# Patient Record
Sex: MALE | Race: BLACK OR AFRICAN AMERICAN | Employment: FULL TIME | URBAN - METROPOLITAN AREA
[De-identification: names, ages, dates, MRNs, and addresses within clinical notes are randomized per-mention and may not be internally consistent; named-entity substitution may affect disease eponyms.]

---

## 2021-10-16 ENCOUNTER — HOSPITAL ENCOUNTER (EMERGENCY)
Age: 41
Discharge: HOME | End: 2021-10-16
Attending: EMERGENCY MEDICINE
Payer: MEDICAID

## 2021-10-16 VITALS
WEIGHT: 155 LBS | BODY MASS INDEX: 25.83 KG/M2 | OXYGEN SATURATION: 98 % | HEART RATE: 90 BPM | HEIGHT: 65 IN | RESPIRATION RATE: 15 BRPM | TEMPERATURE: 98.8 F | DIASTOLIC BLOOD PRESSURE: 90 MMHG | SYSTOLIC BLOOD PRESSURE: 138 MMHG

## 2021-10-16 DIAGNOSIS — R11.2 NON-INTRACTABLE VOMITING WITH NAUSEA, UNSPECIFIED VOMITING TYPE: Primary | ICD-10-CM

## 2021-10-16 PROCEDURE — 99283 EMERGENCY DEPT VISIT LOW MDM: CPT

## 2021-10-16 PROCEDURE — 6370000000 HC RX 637 (ALT 250 FOR IP): Performed by: STUDENT IN AN ORGANIZED HEALTH CARE EDUCATION/TRAINING PROGRAM

## 2021-10-16 RX ORDER — FAMOTIDINE 20 MG/1
20 TABLET, FILM COATED ORAL 2 TIMES DAILY
Qty: 10 TABLET | Refills: 0 | Status: SHIPPED | OUTPATIENT
Start: 2021-10-16

## 2021-10-16 RX ORDER — ONDANSETRON 4 MG/1
4 TABLET, FILM COATED ORAL EVERY 8 HOURS PRN
Qty: 5 TABLET | Refills: 0 | Status: SHIPPED | OUTPATIENT
Start: 2021-10-16

## 2021-10-16 RX ORDER — FAMOTIDINE 20 MG/1
20 TABLET, FILM COATED ORAL ONCE
Status: COMPLETED | OUTPATIENT
Start: 2021-10-16 | End: 2021-10-16

## 2021-10-16 RX ORDER — ONDANSETRON 4 MG/1
4 TABLET, ORALLY DISINTEGRATING ORAL ONCE
Status: COMPLETED | OUTPATIENT
Start: 2021-10-16 | End: 2021-10-16

## 2021-10-16 RX ADMIN — ONDANSETRON 4 MG: 4 TABLET, ORALLY DISINTEGRATING ORAL at 12:59

## 2021-10-16 RX ADMIN — FAMOTIDINE 20 MG: 20 TABLET, FILM COATED ORAL at 12:59

## 2021-10-16 ASSESSMENT — ENCOUNTER SYMPTOMS
BACK PAIN: 0
BLOOD IN STOOL: 0
ABDOMINAL PAIN: 1
RECTAL PAIN: 0
ANAL BLEEDING: 0
DIARRHEA: 0
SINUS PAIN: 0
CONSTIPATION: 0
NAUSEA: 1
EYE PAIN: 0
COUGH: 0
SORE THROAT: 0
VOMITING: 1
SHORTNESS OF BREATH: 0

## 2021-10-16 NOTE — Clinical Note
Patricia Andi was seen and treated in our emergency department on 10/16/2021. He may return to work on 10/17/2021. If you have any questions or concerns, please don't hesitate to call.       Chelsey Hammond MD

## 2021-10-16 NOTE — ED PROVIDER NOTES
[facility] ED     Emergency Department     Faculty Attestation    I performed a history and physical examination of the patient and discussed management with the resident. I reviewed the residents note and agree with the documented findings and plan of care. Any areas of disagreement are noted on the chart. I was personally present for the key portions of any procedures. I have documented in the chart those procedures where I was not present during the key portions. I have reviewed the emergency nurses triage note. I agree with the chief complaint, past medical history, past surgical history, allergies, medications, social and family history as documented unless otherwise noted below. For Physician Assistant/ Nurse Practitioner cases/documentation I have personally evaluated this patient and have completed at least one if not all key elements of the E/M (history, physical exam, and MDM). Additional findings are as noted. This patient was evaluated in the Emergency Department for symptoms described in the history of present illness. He/she was evaluated in the context of the global COVID-19 pandemic, which necessitated consideration that the patient might be at risk for infection with the SARS-CoV-2 virus that causes COVID-19. Institutional protocols and algorithms that pertain to the evaluation of patients at risk for COVID-19 are in a state of rapid change based on information released by regulatory bodies including the CDC and federal and state organizations. These policies and algorithms were followed during the patient's care in the ED. Patient here with abdominal pain vomiting diarrhea after eating [employer] last night. Several friends ate similar food at the same time no other symptoms for them. However patient states he has not had a fast food hamburger or [employer] fries in many years. Still has an appetite. No blood in the emesis or stools.   On exam nontoxic well-appearing abdomen soft nontender no rebound or guarding. Likely foods sensitivity due to having not had fast food.   Will give Zofran, ensure can tolerate p.o., anticipate discharge      Critical Care     none    Rufus Maldonado MD, Travis Gomez  Attending Emergency  Physician             Rufus Maldonado MD  10/16/21 1300

## 2021-10-16 NOTE — ED NOTES
This patient was assessed by the doctor only. Nurse processed and completed the orders from this doctor ie labs, meds, and/or EKG.       Filipe Romero RN  10/16/21 0861

## 2021-10-16 NOTE — ED PROVIDER NOTES
[facility] ED  Emergency Department Encounter  EmergencyMedicineResident     This patient was seen during the COVID-19 crisis. There were limited resources and those resources we did have had to be conserved for the sickest of patients. Pt Name: Edgar Fischer  MRN: [id number]  [place] 1980  Date of evaluation: 10/16/21  PCP: No primary care provider on file. CHIEF COMPLAINT       Chief Complaint   Patient presents with    Emesis     thinks he may have food poision after eating [employer] big mac last night     Diarrhea       HISTORY OF PRESENT ILLNESS  (Location/Symptom, Timing/Onset, Context/Setting, Quality, Duration, Modifying Factors, Severity.)      Edgar Fischer is a 46 y.o. male who presents for evaluation of single episode of nausea and emesis this morning in multiple bouts of formed bowel movements throughout the day today after eating [employer] for the first time in over 3 years. Patient states that he had quarter pounder patties with a big Mac last night. Patient states that he woke up this morning feeling nauseous and vomited. He then had multiple bowel movements today formed stool. He states he had to miss work because he was feeling ill. He denies any blood in his emesis or in his BM. He states he is feeling well prior. No infectious etiology. PAST MEDICAL / SURGICAL /SOCIAL / FAMILY HISTORY      has no past medical history on file. has no past surgical history on file.       Social History     Socioeconomic History    Marital status: Single     Spouse name: Not on file    Number of children: Not on file    Years of education: Not on file    Highest education level: Not on file   Occupational History    Not on file   Tobacco Use    Smoking status: Not on file   Substance and Sexual Activity    Alcohol use: Not on file    Drug use: Not on file    Sexual activity: Not on file   Other Topics Concern    Not on file   Social History Narrative    Not on file     Social Determinants of Health     Financial Resource Strain:     Difficulty of Paying Living Expenses:    Food Insecurity:     Worried About Running Out of Food in the Last Year:     [address] in the Last Year:    Transportation Needs:     Lack of Transportation (Medical):  Lack of Transportation (Non-Medical):    Physical Activity:     Days of Exercise per Week:     Minutes of Exercise per Session:    Stress:     Feeling of Stress :    Social Connections:     Frequency of Communication with Friends and Family:     Frequency of Social Gatherings with Friends and Family:     Attends Worship Services:     Active Member of Clubs or Organizations:     Attends Club or Organization Meetings:     Marital Status:    Intimate Partner Violence:     Fear of Current or Ex-Partner:     Emotionally Abused:     Physically Abused:     Sexually Abused:        No family history on file. Allergies:  Patient has no known allergies. Home Medications:  Prior to Admission medications    Medication Sig Start Date End Date Taking? Authorizing Provider   ondansetron (ZOFRAN) 4 MG tablet Take 1 tablet by mouth every 8 hours as needed for Nausea 10/16/21  Yes Darling Rodriguez MD   famotidine (PEPCID) 20 MG tablet Take 1 tablet by mouth 2 times daily 10/16/21  Yes Darling Rodriguez MD       REVIEW OF SYSTEMS    (2-9 systems for level 4, 10 or more forlevel 5)      Review of Systems   Constitutional: Negative for activity change and fever. HENT: Negative for congestion, sinus pain and sore throat. Eyes: Negative for pain and visual disturbance. Respiratory: Negative for cough and shortness of breath. Cardiovascular: Negative for chest pain. Gastrointestinal: Positive for abdominal pain, nausea and vomiting. Negative for anal bleeding, blood in stool, constipation, diarrhea and rectal pain.    Genitourinary: Negative for difficulty urinating, dysuria and hematuria. Musculoskeletal: Negative for back pain and myalgias. Skin: Negative for rash and wound. Neurological: Negative for dizziness, light-headedness and headaches. Psychiatric/Behavioral: Negative for agitation and confusion. PHYSICAL EXAM   (up to 7 for level 4, 8 or more forlevel 5)      ED TRIAGE VITALS BP: (!) 138/90, Temp: 98.8 °F (37.1 °C), Pulse: 90, Resp: 15, SpO2: 98 %    Vitals:    10/16/21 1245 10/16/21 1247   BP: (!) 138/90    Pulse: 90    Resp: 15    Temp: 98.8 °F (37.1 °C)    TempSrc: Oral    SpO2: 98%    Weight:  155 lb (70.3 kg)   Height:  5' 5\" (1.651 m)         Physical Exam  Vitals and nursing note reviewed. Constitutional:       Appearance: Normal appearance. HENT:      Head: Normocephalic and atraumatic. Nose: Nose normal.      Mouth/Throat:      Mouth: Mucous membranes are moist.   Eyes:      Extraocular Movements: Extraocular movements intact. Pupils: Pupils are equal, round, and reactive to light. Cardiovascular:      Rate and Rhythm: Normal rate and regular rhythm. Pulses: Normal pulses. Heart sounds: Normal heart sounds. Pulmonary:      Effort: Pulmonary effort is normal.      Breath sounds: Normal breath sounds. Abdominal:      General: Abdomen is flat. Palpations: Abdomen is soft. Musculoskeletal:         General: Normal range of motion. Cervical back: Normal range of motion. Skin:     General: Skin is warm and dry. Capillary Refill: Capillary refill takes less than 2 seconds. Neurological:      General: No focal deficit present. Mental Status: He is alert and oriented to person, place, and time. Psychiatric:         Mood and Affect: Mood normal.         Behavior: Behavior normal.           DIFFERENTIAL  DIAGNOSIS     PLAN (LABS / IMAGING / EKG):  No orders of the defined types were placed in this encounter.       MEDICATIONS ORDERED:  ED Medication Orders (From admission, onward)    Start Ordered     Status Ordering Provider    10/16/21 1300 10/16/21 1254  ondansetron (ZOFRAN-ODT) disintegrating tablet 4 mg  ONCE      Last MAR action: Given - by SANGEETHA AMRTINEZ on 10/16/21 at 1259 LEANNA TEJADA    10/16/21 1300 10/16/21 1254  famotidine (PEPCID) tablet 20 mg  ONCE      Last MAR action: Given - by SANGEETHA MARTINEZ on 10/16/21 at 1259 LEANNA TEJADA          DDX: Gastritis, diet change    DIAGNOSTIC RESULTS / EMERGENCY DEPARTMENT COURSE / MDM     IMPRESSION & INITIAL PLAN:  This is a 81-year-old male presenting emerged from today for evaluation of single episode of episode of multiple bowel movements every to [facility] for the first time in 3 years. Patient is a significant past medical history of abdominal surgery. He states he has colitis and gastritis in distant past.  Does not take any medication on regular basis. Physical exam benign. Well-appearing male. Patient provided with antimedic medication. Passed p.o. challenge during the entire box lunch. Provided the patient with small quantity of Zofran and Pepcid. Strict return precautions. LABS:  No results found for this visit on 10/16/21. RADIOLOGY:  No orders to display   CONSULTS:  None    CRITICAL CARE:  See attending physician note    FINAL IMPRESSION      1.  Non-intractable vomiting with nausea, unspecified vomiting type          DISPOSITION / PLAN     DISPOSITION Decision To Discharge 10/16/2021 01:25:45 PM      PATIENT REFERRED TO:  [facility] ED  [address] [address]  [phone]    If symptoms worsen    [address] [address] [phone]  Schedule an appointment as soon as possible for a visit in 2 days        DISCHARGE MEDICATIONS:  New Prescriptions    FAMOTIDINE (PEPCID) 20 MG TABLET    Take 1 tablet by mouth 2 times daily    ONDANSETRON (ZOFRAN) 4 MG TABLET    Take 1 tablet by mouth every 8 hours as needed for Nausea Modified Medications    No medications on file        Preet Fields MD  Emergency Medicine Resident    (Please note that portions of this note were completed with a voice recognition program.  Efforts were made to edit the dictations but occasionally words are mis-transcribed.)       Preet Fields MD  Resident  10/16/21 9682

## 2021-10-16 NOTE — ED TRIAGE NOTES
Pt presents to ED with c/o emesis and frequent bowel movements. Pt stated he ate a big mac from [email or web address] last night and reports feeling sick since. Pt stated he presents to be sure that he does not have food poison. Denies any other issues at this time.

## 2021-10-16 NOTE — ED NOTES
Bed: 36  Expected date:   Expected time:   Means of arrival:   Comments:     Holli Valencia RN  10/16/21 3912

## 2022-03-30 ENCOUNTER — HOSPITAL ENCOUNTER (EMERGENCY)
Age: 42
Discharge: HOME | End: 2022-03-30
Attending: EMERGENCY MEDICINE
Payer: MEDICAID

## 2022-03-30 ENCOUNTER — APPOINTMENT (OUTPATIENT)
Dept: GENERAL RADIOLOGY | Age: 42
End: 2022-03-30
Payer: MEDICAID

## 2022-03-30 VITALS
HEIGHT: 65 IN | WEIGHT: 155 LBS | OXYGEN SATURATION: 96 % | TEMPERATURE: 98.5 F | BODY MASS INDEX: 25.83 KG/M2 | RESPIRATION RATE: 18 BRPM | DIASTOLIC BLOOD PRESSURE: 105 MMHG | HEART RATE: 94 BPM | SYSTOLIC BLOOD PRESSURE: 138 MMHG

## 2022-03-30 DIAGNOSIS — R11.0 NAUSEA: ICD-10-CM

## 2022-03-30 DIAGNOSIS — J06.9 VIRAL URI WITH COUGH: Primary | ICD-10-CM

## 2022-03-30 LAB
-: ABNORMAL
BILIRUBIN URINE: NEGATIVE
COLOR: YELLOW
EPITHELIAL CELLS UA: ABNORMAL /HPF (ref 0–5)
FLU A ANTIGEN: NEGATIVE
FLU B ANTIGEN: NEGATIVE
GLUCOSE URINE: NEGATIVE
KETONES, URINE: NEGATIVE
LEUKOCYTE ESTERASE, URINE: NEGATIVE
MUCUS: ABNORMAL
NITRITE, URINE: NEGATIVE
PH UA: 7.5 (ref 5–8)
PROTEIN UA: ABNORMAL
RBC UA: ABNORMAL /HPF (ref 0–2)
SARS-COV-2, RAPID: NOT DETECTED
SPECIFIC GRAVITY UA: 1.02 (ref 1–1.03)
SPECIMEN DESCRIPTION: NORMAL
TURBIDITY: CLEAR
URINE HGB: NEGATIVE
UROBILINOGEN, URINE: NORMAL
WBC UA: ABNORMAL /HPF (ref 0–5)

## 2022-03-30 PROCEDURE — 71045 X-RAY EXAM CHEST 1 VIEW: CPT

## 2022-03-30 PROCEDURE — 87491 CHLMYD TRACH DNA AMP PROBE: CPT

## 2022-03-30 PROCEDURE — 81001 URINALYSIS AUTO W/SCOPE: CPT

## 2022-03-30 PROCEDURE — 87804 INFLUENZA ASSAY W/OPTIC: CPT

## 2022-03-30 PROCEDURE — 6370000000 HC RX 637 (ALT 250 FOR IP): Performed by: PHYSICIAN ASSISTANT

## 2022-03-30 PROCEDURE — 99283 EMERGENCY DEPT VISIT LOW MDM: CPT

## 2022-03-30 PROCEDURE — 87591 N.GONORRHOEAE DNA AMP PROB: CPT

## 2022-03-30 PROCEDURE — 87635 SARS-COV-2 COVID-19 AMP PRB: CPT

## 2022-03-30 RX ORDER — BENZONATATE 100 MG/1
100 CAPSULE ORAL 3 TIMES DAILY PRN
Qty: 30 CAPSULE | Refills: 0 | Status: SHIPPED | OUTPATIENT
Start: 2022-03-30 | End: 2022-04-06

## 2022-03-30 RX ORDER — ONDANSETRON 4 MG/1
4 TABLET, ORALLY DISINTEGRATING ORAL ONCE
Status: COMPLETED | OUTPATIENT
Start: 2022-03-30 | End: 2022-03-30

## 2022-03-30 RX ORDER — ONDANSETRON 4 MG/1
4 TABLET, ORALLY DISINTEGRATING ORAL EVERY 8 HOURS PRN
Qty: 20 TABLET | Refills: 0 | Status: SHIPPED | OUTPATIENT
Start: 2022-03-30

## 2022-03-30 RX ADMIN — ONDANSETRON 4 MG: 4 TABLET, ORALLY DISINTEGRATING ORAL at 12:20

## 2022-03-30 NOTE — Clinical Note
Kimberly Yousif was seen and treated in our emergency department on 3/30/2022. He may return to work on 03/31/2022. If you have any questions or concerns, please don't hesitate to call.       Jhon Clark PA-C

## 2022-03-30 NOTE — ED TRIAGE NOTES
Pt to Ed c/o increasing sob and nausea. Pt AOx4. Denies emesis. Sts occasional non productive cough. Patent airway, LS clr throughout. Skin warm, appropriate to hue and dry. Bed to lowest position, side rails up x2, call light within reach.

## 2022-03-31 LAB
C. TRACHOMATIS DNA ,URINE: NEGATIVE
N. GONORRHOEAE DNA, URINE: NEGATIVE
SPECIMEN DESCRIPTION: NORMAL

## 2022-04-05 NOTE — ED PROVIDER NOTES
eMERGENCY dEPARTMENT eNCOUnter   Independent Attestation     Pt Name: Anoop Ashby  MRN: [id number]  [place] 1980  Date of evaluation: 4/4/22     Anoop Ashby is a 46 y.o. male with CC: Shortness of Breath, Emesis, and Chest Congestion        This visit was performed by both a physician and an APC. I performed all aspects of the MDM as documented. The care is provided during an unprecedented national emergency due to the novel coronavirus, COVID 19.     Gt Argueta MD  Attending Emergency Physician            Gt Argueta MD  04/04/22 2018
°C) -- 94 18 96 % 5' 5\" (1.651 m) 155 lb (70.3 kg)      Physical Exam  Constitutional:       Appearance: He is well-developed. HENT:      Head: Normocephalic and atraumatic. Cardiovascular:      Rate and Rhythm: Normal rate and regular rhythm. Pulmonary:      Effort: Pulmonary effort is normal.      Breath sounds: Normal breath sounds. No decreased breath sounds or wheezing. Abdominal:      Palpations: Abdomen is soft. Tenderness: There is no abdominal tenderness. There is no rebound. Musculoskeletal:         General: Normal range of motion. Cervical back: Normal range of motion and neck supple. Skin:     General: Skin is warm. Neurological:      Mental Status: He is alert and oriented to person, place, and time. Psychiatric:         Behavior: Behavior normal.                 DIAGNOSTIC RESULTS     EKG: All EKG's are interpreted by the Emergency Department Physician who either signs or Co-signs this chart in the absence of a cardiologist.        RADIOLOGY:   Non-plain film images such as CT, Ultrasound and MRI are read by the radiologist. Plain radiographic images arevisualized and preliminarily interpreted by the emergency physician with the below findings:        Interpretation per the Radiologist below, if available at thetime of this note:          ED BEDSIDE ULTRASOUND:   Performed by ED Physician - none    LABS:  Labs Reviewed   URINALYSIS WITH REFLEX TO CULTURE - Abnormal; Notable for the following components:       Result Value    Protein, UA 1+ (*)     All other components within normal limits   MICROSCOPIC URINALYSIS - Abnormal; Notable for the following components:    Mucus, UA 1+ (*)     All other components within normal limits   RAPID INFLUENZA A/B ANTIGENS   COVID-19, RAPID   C.TRACHOMATIS N.GONORRHOEAE DNA, URINE       All other labs were within normal range or not returned as of this dictation.     EMERGENCY DEPARTMENT COURSE and DIFFERENTIAL DIAGNOSIS/MDM:   Vitals:

## 2022-09-18 ENCOUNTER — APPOINTMENT (OUTPATIENT)
Dept: GENERAL RADIOLOGY | Age: 42
End: 2022-09-18
Payer: MEDICAID

## 2022-09-18 ENCOUNTER — HOSPITAL ENCOUNTER (EMERGENCY)
Age: 42
Discharge: HOME | End: 2022-09-18
Attending: EMERGENCY MEDICINE
Payer: MEDICAID

## 2022-09-18 VITALS
SYSTOLIC BLOOD PRESSURE: 147 MMHG | HEIGHT: 65 IN | OXYGEN SATURATION: 97 % | WEIGHT: 155 LBS | TEMPERATURE: 98.3 F | RESPIRATION RATE: 18 BRPM | DIASTOLIC BLOOD PRESSURE: 93 MMHG | HEART RATE: 75 BPM | BODY MASS INDEX: 25.83 KG/M2

## 2022-09-18 DIAGNOSIS — S46.912A STRAIN OF AC JOINT, LEFT, INITIAL ENCOUNTER: Primary | ICD-10-CM

## 2022-09-18 PROCEDURE — 73030 X-RAY EXAM OF SHOULDER: CPT

## 2022-09-18 PROCEDURE — 6360000002 HC RX W HCPCS: Performed by: STUDENT IN AN ORGANIZED HEALTH CARE EDUCATION/TRAINING PROGRAM

## 2022-09-18 PROCEDURE — 99284 EMERGENCY DEPT VISIT MOD MDM: CPT

## 2022-09-18 PROCEDURE — 96374 THER/PROPH/DIAG INJ IV PUSH: CPT

## 2022-09-18 RX ORDER — IBUPROFEN 800 MG/1
800 TABLET ORAL EVERY 6 HOURS PRN
Qty: 21 TABLET | Refills: 0 | Status: SHIPPED | OUTPATIENT
Start: 2022-09-18

## 2022-09-18 RX ORDER — ACETAMINOPHEN 325 MG/1
650 TABLET ORAL EVERY 6 HOURS PRN
Qty: 120 TABLET | Refills: 0 | Status: SHIPPED | OUTPATIENT
Start: 2022-09-18

## 2022-09-18 RX ORDER — LIDOCAINE 50 MG/G
1 PATCH TOPICAL DAILY
Qty: 10 PATCH | Refills: 0 | Status: SHIPPED | OUTPATIENT
Start: 2022-09-18 | End: 2022-09-28

## 2022-09-18 RX ORDER — MORPHINE SULFATE 4 MG/ML
4 INJECTION, SOLUTION INTRAMUSCULAR; INTRAVENOUS ONCE
Status: COMPLETED | OUTPATIENT
Start: 2022-09-18 | End: 2022-09-18

## 2022-09-18 RX ADMIN — MORPHINE SULFATE 4 MG: 4 INJECTION INTRAVENOUS at 13:59

## 2022-09-18 ASSESSMENT — PAIN DESCRIPTION - PAIN TYPE: TYPE: ACUTE PAIN

## 2022-09-18 ASSESSMENT — PAIN - FUNCTIONAL ASSESSMENT: PAIN_FUNCTIONAL_ASSESSMENT: 0-10

## 2022-09-18 ASSESSMENT — PAIN SCALES - GENERAL
PAINLEVEL_OUTOF10: 8
PAINLEVEL_OUTOF10: 10

## 2022-09-18 ASSESSMENT — PAIN DESCRIPTION - ORIENTATION: ORIENTATION: LEFT

## 2022-09-18 ASSESSMENT — PAIN DESCRIPTION - DESCRIPTORS: DESCRIPTORS: SHARP;STABBING

## 2022-09-18 ASSESSMENT — PAIN DESCRIPTION - FREQUENCY: FREQUENCY: CONTINUOUS

## 2022-09-18 ASSESSMENT — PAIN DESCRIPTION - LOCATION: LOCATION: SHOULDER
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
## 2022-09-18 NOTE — DISCHARGE INSTRUCTIONS
Emergency department today due to left-sided shoulder pain. Please wear your sling, move your shoulder as tolerated. Please follow-up with orthopedics. You have a AC joint strain, please take Tylenol Motrin for pain. We will prescribe you with a lidocaine patch as well. Please return to the emergency department with any increasing pain, numbness, tingling, fevers, chills, nausea, vomiting, chest pain, shortness of breath.
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
## 2022-09-19 NOTE — ED PROVIDER NOTES
[facility]     Emergency Department     Faculty Attestation    I performed a history and physical examination of the patient and discussed management with the resident. I reviewed the residents note and agree with the documented findings including all diagnostic interpretations and plan of care. Any areas of disagreement are noted on the chart. I was personally present for the key portions of any procedures. I have documented in the chart those procedures where I was not present during the key portions. I have reviewed the emergency nurses triage note. I agree with the chief complaint, past medical history, past surgical history, allergies, medications, social and family history as documented unless otherwise noted below. Documentation of the HPI, Physical Exam and Medical Decision Making performed by ed is based on my personal performance of the HPI, PE and MDM. For Physician Assistant/ Nurse Practitioner cases/documentation I have personally evaluated this patient and have completed at least one if not all key elements of the E/M (history, physical exam, and MDM). Additional findings are as noted. Primary Care Physician: No primary care provider on file. History: This is a 38 y.o. male who presents to the Emergency Department with complaint of fall, shoulder injury. Patient fell from [employer] last night at approximately 7 PM.  Struck his left shoulder. He is concerned he may have dislocated it. Denies any numbness or weakness. Did not strike his head. No blood thinner use. Physical:     height is 5' 5\" (1.651 m) and weight is 155 lb (70.3 kg). His oral temperature is 98.3 °F (36.8 °C). His blood pressure is 147/93 (abnormal) and his pulse is 75. His respiration is 18 and oxygen saturation is 97%.    38 y.o. male no acute distress, examination of the left shoulder shows abrasion over the lateral aspect.   The humeral head appears to set slightly lower than expected however not as low as would be expected with a full dislocation.   Neurovascular intact through the arm including deltoid sensation    Impression: Shoulder injury    Plan: X-ray, analgesia, reassess    Blaine Fleming MD, [place]  Attending Emergency Physician         Jhon Ricci MD  09/19/22 1950

## 2022-09-22 ASSESSMENT — ENCOUNTER SYMPTOMS
FACIAL SWELLING: 0
TROUBLE SWALLOWING: 0
VOMITING: 0
ABDOMINAL PAIN: 0
SHORTNESS OF BREATH: 0
ABDOMINAL DISTENTION: 0
CHEST TIGHTNESS: 0
BACK PAIN: 0
NAUSEA: 0

## 2022-09-26 ENCOUNTER — OFFICE VISIT (OUTPATIENT)
Dept: ORTHOPEDIC SURGERY | Age: 42
End: 2022-09-26
Payer: MEDICAID

## 2022-09-26 VITALS — HEIGHT: 65 IN | WEIGHT: 155 LBS | BODY MASS INDEX: 25.83 KG/M2

## 2022-09-26 DIAGNOSIS — S49.92XA ACROMIOCLAVICULAR JOINT INJURY, LEFT, INITIAL ENCOUNTER: Primary | ICD-10-CM

## 2022-09-26 PROCEDURE — 99202 OFFICE O/P NEW SF 15 MIN: CPT | Performed by: ORTHOPAEDIC SURGERY

## 2022-09-26 PROCEDURE — G8427 DOCREV CUR MEDS BY ELIG CLIN: HCPCS | Performed by: ORTHOPAEDIC SURGERY

## 2022-09-26 PROCEDURE — 1036F TOBACCO NON-USER: CPT | Performed by: ORTHOPAEDIC SURGERY

## 2022-09-26 PROCEDURE — G8419 CALC BMI OUT NRM PARAM NOF/U: HCPCS | Performed by: ORTHOPAEDIC SURGERY

## 2022-09-26 RX ORDER — OXYCODONE HYDROCHLORIDE AND ACETAMINOPHEN 5; 325 MG/1; MG/1
1 TABLET ORAL EVERY 6 HOURS PRN
Qty: 28 TABLET | Refills: 0 | Status: SHIPPED | OUTPATIENT
Start: 2022-09-26 | End: 2022-10-18

## 2022-09-26 RX ORDER — CYCLOBENZAPRINE HCL 5 MG
5 TABLET ORAL 3 TIMES DAILY PRN
Qty: 30 TABLET | Refills: 0 | Status: SHIPPED | OUTPATIENT
Start: 2022-09-26 | End: 2022-10-18 | Stop reason: SDUPTHER
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
## 2022-10-11 ENCOUNTER — OFFICE VISIT (OUTPATIENT)
Dept: ORTHOPEDIC SURGERY | Age: 42
End: 2022-10-11
Payer: MEDICAID

## 2022-10-11 VITALS — HEIGHT: 65 IN | BODY MASS INDEX: 25.83 KG/M2 | WEIGHT: 155 LBS

## 2022-10-11 DIAGNOSIS — S49.92XA ACROMIOCLAVICULAR JOINT INJURY, LEFT, INITIAL ENCOUNTER: Primary | ICD-10-CM

## 2022-10-11 PROCEDURE — G8427 DOCREV CUR MEDS BY ELIG CLIN: HCPCS | Performed by: ORTHOPAEDIC SURGERY

## 2022-10-11 PROCEDURE — G8419 CALC BMI OUT NRM PARAM NOF/U: HCPCS | Performed by: ORTHOPAEDIC SURGERY

## 2022-10-11 PROCEDURE — 99211 OFF/OP EST MAY X REQ PHY/QHP: CPT | Performed by: ORTHOPAEDIC SURGERY
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
## 2022-10-18 DIAGNOSIS — S49.92XA ACROMIOCLAVICULAR JOINT INJURY, LEFT, INITIAL ENCOUNTER: ICD-10-CM

## 2022-10-18 RX ORDER — CYCLOBENZAPRINE HCL 5 MG
5 TABLET ORAL 3 TIMES DAILY PRN
Qty: 30 TABLET | Refills: 0 | Status: CANCELLED | OUTPATIENT
Start: 2022-10-18 | End: 2022-10-28

## 2022-10-18 RX ORDER — CYCLOBENZAPRINE HCL 5 MG
5 TABLET ORAL 3 TIMES DAILY PRN
Qty: 30 TABLET | Refills: 0 | Status: SHIPPED | OUTPATIENT
Start: 2022-10-18 | End: 2022-10-28

## 2022-10-18 RX ORDER — OXYCODONE HYDROCHLORIDE AND ACETAMINOPHEN 5; 325 MG/1; MG/1
1 TABLET ORAL EVERY 8 HOURS PRN
Qty: 21 TABLET | Refills: 0 | Status: SHIPPED | OUTPATIENT
Start: 2022-10-18 | End: 2022-10-25

## 2022-10-18 RX ORDER — OXYCODONE HYDROCHLORIDE AND ACETAMINOPHEN 5; 325 MG/1; MG/1
1 TABLET ORAL EVERY 6 HOURS PRN
Qty: 28 TABLET | Refills: 0 | Status: CANCELLED | OUTPATIENT
Start: 2022-10-18 | End: 2022-10-25

## 2022-10-18 NOTE — TELEPHONE ENCOUNTER
Patient called and requested prescription refills to be sent to [facility] for:      cyclobenzaprine (flexeril) 5 MG   LRF: 09/26/22 #30      oxyCODONE-acetaminophen (PERCOCET) 5-325 MG  LRF: 09/26/22  #28     He may be reached at [phone] if needed. Thank you.

## 2023-09-15 ENCOUNTER — HOSPITAL ENCOUNTER (EMERGENCY)
Age: 43
Discharge: HOME | End: 2023-09-15
Attending: STUDENT IN AN ORGANIZED HEALTH CARE EDUCATION/TRAINING PROGRAM
Payer: MEDICAID

## 2023-09-15 VITALS
TEMPERATURE: 98.9 F | RESPIRATION RATE: 18 BRPM | DIASTOLIC BLOOD PRESSURE: 90 MMHG | WEIGHT: 155 LBS | SYSTOLIC BLOOD PRESSURE: 139 MMHG | BODY MASS INDEX: 25.83 KG/M2 | OXYGEN SATURATION: 98 % | HEART RATE: 87 BPM | HEIGHT: 65 IN

## 2023-09-15 DIAGNOSIS — T63.481A INSECT STINGS, ACCIDENTAL OR UNINTENTIONAL, INITIAL ENCOUNTER: Primary | ICD-10-CM

## 2023-09-15 PROCEDURE — 6370000000 HC RX 637 (ALT 250 FOR IP): Performed by: NURSE PRACTITIONER

## 2023-09-15 PROCEDURE — 6360000002 HC RX W HCPCS: Performed by: NURSE PRACTITIONER

## 2023-09-15 PROCEDURE — 99283 EMERGENCY DEPT VISIT LOW MDM: CPT

## 2023-09-15 RX ORDER — CEPHALEXIN 500 MG/1
500 CAPSULE ORAL 3 TIMES DAILY
Qty: 21 CAPSULE | Refills: 0 | Status: SHIPPED | OUTPATIENT
Start: 2023-09-15 | End: 2023-09-22

## 2023-09-15 RX ORDER — HYDROXYZINE HYDROCHLORIDE 25 MG/1
25 TABLET, FILM COATED ORAL ONCE
Status: COMPLETED | OUTPATIENT
Start: 2023-09-15 | End: 2023-09-15

## 2023-09-15 RX ORDER — HYDROXYZINE HYDROCHLORIDE 25 MG/1
25 TABLET, FILM COATED ORAL EVERY 8 HOURS PRN
Qty: 15 TABLET | Refills: 0 | Status: SHIPPED | OUTPATIENT
Start: 2023-09-15

## 2023-09-15 RX ORDER — CEPHALEXIN 500 MG/1
500 CAPSULE ORAL ONCE
Status: COMPLETED | OUTPATIENT
Start: 2023-09-15 | End: 2023-09-15

## 2023-09-15 RX ADMIN — DEXAMETHASONE 6 MG: 2 TABLET ORAL at 19:25

## 2023-09-15 RX ADMIN — HYDROXYZINE HYDROCHLORIDE 25 MG: 25 TABLET, FILM COATED ORAL at 19:25

## 2023-09-15 RX ADMIN — CEPHALEXIN 500 MG: 500 CAPSULE ORAL at 19:25

## 2023-09-15 ASSESSMENT — PAIN DESCRIPTION - ORIENTATION: ORIENTATION: RIGHT

## 2023-09-15 ASSESSMENT — PAIN DESCRIPTION - LOCATION: LOCATION: CHEST

## 2023-09-15 ASSESSMENT — ENCOUNTER SYMPTOMS
COLOR CHANGE: 1
TROUBLE SWALLOWING: 0
FACIAL SWELLING: 0
SHORTNESS OF BREATH: 0

## 2023-09-15 ASSESSMENT — PAIN - FUNCTIONAL ASSESSMENT: PAIN_FUNCTIONAL_ASSESSMENT: 0-10

## 2023-09-15 ASSESSMENT — PAIN SCALES - GENERAL: PAINLEVEL_OUTOF10: 6

## 2023-09-15 NOTE — DISCHARGE INSTRUCTIONS
Call Gilles Duverney ([phone]) to establish care for follow up. You can also call [id number] [place] [address] [address] at [phone] to establish care.

## 2023-09-15 NOTE — ED PROVIDER NOTES
[address] [facility] ED  eMERGENCY dEPARTMENT eNCOUnter      Pt Name: Nanda Gruber  MRN: [id number]  [address] [facility] 1980  Date of evaluation: 9/15/2023  Provider: JERRY Lewis CNP    CHIEF COMPLAINT       Chief Complaint   Patient presents with    Insect Bite     Bee sting late last night, this AM noticed welt to right upper chest.  Pt pulled out stinger, but area is still red, warm, itchy, and painful         HISTORY OF PRESENT ILLNESS  (Location/Symptom, Timing/Onset, Context/Setting, Quality, Duration, Modifying Factors, Severity.)   Nanda Gruber is a 41 y.o. male who presents to the emergency department. C/o erythematous, itchy area to his right chest. States he was sting by something last night. The erythema and itching have since increased. Denies fever, chills, drainage. Denies SOB, facial swelling. Rates his pain 6/10 at this time. Nursing Notes were reviewed. ALLERGIES     Patient has no known allergies. CURRENT MEDICATIONS       Discharge Medication List as of 9/15/2023  7:27 PM        CONTINUE these medications which have NOT CHANGED    Details   acetaminophen (TYLENOL) 325 MG tablet Take 2 tablets by mouth every 6 hours as needed for Pain, Disp-120 tablet, R-0Normal      ibuprofen (IBU) 800 MG tablet Take 1 tablet by mouth every 6 hours as needed for Pain, Disp-21 tablet, R-0Normal      ondansetron (ZOFRAN ODT) 4 MG disintegrating tablet Take 1 tablet by mouth every 8 hours as needed for Nausea, Disp-20 tablet, R-0Print      ondansetron (ZOFRAN) 4 MG tablet Take 1 tablet by mouth every 8 hours as needed for Nausea, Disp-5 tablet, R-0Print      famotidine (PEPCID) 20 MG tablet Take 1 tablet by mouth 2 times daily, Disp-10 tablet, R-0Print             PAST MEDICAL HISTORY   History reviewed. No pertinent past medical history. SURGICAL HISTORY     History reviewed. No pertinent surgical history. FAMILY HISTORY     History reviewed.  No pertinent